# Patient Record
Sex: FEMALE | Race: WHITE | NOT HISPANIC OR LATINO | Employment: FULL TIME | URBAN - METROPOLITAN AREA
[De-identification: names, ages, dates, MRNs, and addresses within clinical notes are randomized per-mention and may not be internally consistent; named-entity substitution may affect disease eponyms.]

---

## 2017-10-17 ENCOUNTER — TRANSCRIBE ORDERS (OUTPATIENT)
Dept: ADMINISTRATIVE | Facility: HOSPITAL | Age: 52
End: 2017-10-17

## 2017-10-17 DIAGNOSIS — R10.13 DYSPEPSIA: Primary | ICD-10-CM

## 2017-10-19 ENCOUNTER — HOSPITAL ENCOUNTER (OUTPATIENT)
Dept: ULTRASOUND IMAGING | Facility: HOSPITAL | Age: 52
Discharge: HOME | End: 2017-10-19
Attending: INTERNAL MEDICINE | Admitting: INTERNAL MEDICINE

## 2017-10-19 DIAGNOSIS — R10.13 DYSPEPSIA: ICD-10-CM

## 2017-10-19 PROCEDURE — 76705 ECHO EXAM OF ABDOMEN: CPT

## 2021-01-14 ENCOUNTER — OFFICE VISIT (OUTPATIENT)
Dept: INTERNAL MEDICINE | Facility: CLINIC | Age: 56
End: 2021-01-14

## 2021-01-14 VITALS
HEART RATE: 62 BPM | DIASTOLIC BLOOD PRESSURE: 68 MMHG | BODY MASS INDEX: 21.34 KG/M2 | RESPIRATION RATE: 16 BRPM | HEIGHT: 64 IN | SYSTOLIC BLOOD PRESSURE: 110 MMHG | WEIGHT: 125 LBS | OXYGEN SATURATION: 98 % | TEMPERATURE: 96.9 F

## 2021-01-14 DIAGNOSIS — K13.29 WHITE PATCHES ON ORAL MUCOSA: Primary | ICD-10-CM

## 2021-01-14 DIAGNOSIS — K14.6 BURNING MOUTH SYNDROME: Chronic | ICD-10-CM

## 2021-01-14 PROCEDURE — 99213 OFFICE O/P EST LOW 20 MIN: CPT | Performed by: INTERNAL MEDICINE

## 2021-01-14 RX ORDER — AMITRIPTYLINE HYDROCHLORIDE 25 MG/1
50 TABLET, FILM COATED ORAL NIGHTLY
Qty: 180 TABLET | Refills: 1 | Status: SHIPPED | OUTPATIENT
Start: 2021-01-14 | End: 2023-01-30

## 2021-01-14 RX ORDER — AMITRIPTYLINE HYDROCHLORIDE 25 MG/1
TABLET, FILM COATED ORAL
COMMUNITY
Start: 2020-12-28 | End: 2021-01-14 | Stop reason: SDUPTHER

## 2021-01-14 NOTE — PROGRESS NOTES
"Chief Complaint  Thrush    Subjective          Essie Boss presents to [facility] for possible thrush. Has had similar infection in the past, occasionally will resolve with anti-fungals but not always. 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 No recent abx or ICS use. Has a very sour taste in her mouth associated. Dentist does not believe this is thrush. Has tried PPI medications in the past, seen ENT and laryngoscopy showed no reflux.     Objective   Vital Signs:     /68   Pulse 62   Temp 96.9 °F (36.1 °C)   Resp 16   Ht 162.6 cm (64\")   Wt 56.7 kg (125 lb)   SpO2 98%   BMI 21.46 kg/m²     Physical Exam  Vitals signs and nursing note reviewed.   Constitutional:       General: She is not in acute distress.     Appearance: Normal appearance.   HENT:      Nose: Nose normal.      Mouth/Throat:      Tongue: Lesions (white, discolored patches on tongue) present.      Pharynx: Oropharynx is clear. No oropharyngeal exudate.      Tonsils: No tonsillar exudate.   Cardiovascular:      Rate and Rhythm: Normal rate and regular rhythm.      Pulses: Normal pulses.      Heart sounds: No murmur.   Pulmonary:      Effort: Pulmonary effort is normal. No respiratory distress.      Breath sounds: Normal breath sounds.   Abdominal:      General: Bowel sounds are normal.      Palpations: Abdomen is soft.   Neurological:      Mental Status: She is alert.          Result Review : : None     Assessment and Plan      Diagnoses and all orders for this visit:    1. White patches on oral mucosa (Primary)   - feel strongly that this is not thrush, so pt advised to stop taking antifungal agent at this time, discussed reflux as potential cause but pt denies other symptoms other than sour taste in her mouth and is not interested in PPI at this time   - since exact etiology is unknown, and I am not sure if this may be part of the burning mouth syndrome, will refer " onto ENT for more thorough eval, biopsy if needed, and treatment as indicated    Orders:  -     Ambulatory Referral to ENT (Otolaryngology)    2. Burning mouth syndrome   - most symptoms well controlled currently unless current tongue concerns are related, will cont on same medication for now--> refills sent today   - ? If the tongue symptoms could be a dry mouth side effect from her amitriptyline, but she has had those issues predating when she started this medication    Orders:  -     amitriptyline (ELAVIL) 25 MG tablet; Take 2 tablets by mouth Every Night.  Dispense: 180 tablet; Refill: 1      Follow Up   Return if symptoms worsen or fail to improve.    Patient was given instructions and counseling regarding her condition or for health maintenance advice. Please see specific information pulled into the AVS if appropriate.     Anna Fraire MD  [facility] [place] [facility]  Phone: [phone]  Fax: [phone]

## 2021-01-26 ENCOUNTER — TELEPHONE (OUTPATIENT)
Dept: INTERNAL MEDICINE | Facility: CLINIC | Age: 56
End: 2021-01-26

## 2021-01-26 NOTE — TELEPHONE ENCOUNTER
Patient called and had covid. She still has a low grade fever and its been 12 days now. Would like advice on what she needs to do. Please call [phone].    [facility] #[id number] - [place] - [address] [address] [address] [address] - [phone] [facility] [phone]   [phone]

## 2021-01-28 ENCOUNTER — OFFICE VISIT (OUTPATIENT)
Dept: INTERNAL MEDICINE | Facility: CLINIC | Age: 56
End: 2021-01-28

## 2021-01-28 VITALS — TEMPERATURE: 98.1 F | HEIGHT: 61 IN | BODY MASS INDEX: 22.84 KG/M2 | WEIGHT: 121 LBS

## 2021-01-28 DIAGNOSIS — U07.1 COVID-19 VIRUS INFECTION: Primary | ICD-10-CM

## 2021-01-28 PROCEDURE — 99442 PR PHYS/QHP TELEPHONE EVALUATION 11-20 MIN: CPT | Performed by: INTERNAL MEDICINE

## 2021-01-28 NOTE — PROGRESS NOTES
"Chief Complaint  Fever and Covid-19 Home Monitoring Phone Call     You have chosen to receive care through a telephone visit. Do you consent to use a telephone visit for your medical care today? Yes      Subjective          Essie Boss presents to [facility] for follow up COVID. Symptoms started 1/16 with congestion and progressed to fever that persisted until yesterday 1/27. COVID testing was done and positive 1/18. Her  had COVID first but not sure where they got it from. No cough, SOB, chest pain. Drainage is excessive, has bloody rhinorrhea ongoing. Did treat herself for UTI at the start of her infection and has constipation which is ongoing.     Objective   Vital Signs:     Temp 98.1 °F (36.7 °C)   Ht 154.9 cm (61\")   Wt 54.9 kg (121 lb)   BMI 22.86 kg/m²         Result Review : : None     Assessment and Plan      Diagnoses and all orders for this visit:    1. COVID-19 virus infection (Primary)     - pt has been fever free now x 48 hours and has no other ongoing infectious symptoms--> Ok to be released from quarantine 1/30   - ok to cont use of prn OTC meds as needed   - will write letter clearing her from quarantine   - call back if any worsening of symptoms    I spent 11 minutes caring for Essie on this date of service. This time includes time spent by me in the following activities:teleconference    Follow Up   Return if symptoms worsen or fail to improve.    Patient was given instructions and counseling regarding her condition or for health maintenance advice. Please see specific information pulled into the AVS if appropriate.     Anna Fraire MD  [facility] [place] [facility]  Phone: [phone]  Fax: [phone]    "

## 2021-07-14 ENCOUNTER — TELEPHONE (OUTPATIENT)
Dept: INTERNAL MEDICINE | Facility: CLINIC | Age: 56
End: 2021-07-14

## 2021-07-14 DIAGNOSIS — Z12.31 ENCOUNTER FOR SCREENING MAMMOGRAM FOR MALIGNANT NEOPLASM OF BREAST: Primary | ICD-10-CM

## 2021-07-14 NOTE — TELEPHONE ENCOUNTER
EDIT: THE PATIENT GAVE A FAX NUMBER FOR ORDERS. THE NUMBER [phone] AND GOES TO DIAGNOSTIC RADIOLOGY.

## 2021-07-14 NOTE — TELEPHONE ENCOUNTER
Caller: Essie Boss    Relationship: Self    Best call back number: [phone]    What orders are you requesting (i.e. lab or imaging): MAMMOGRAM    In what timeframe would the patient need to come in: ASAP    Where will you receive your lab/imaging services: [facility]

## 2021-08-30 ENCOUNTER — TELEPHONE (OUTPATIENT)
Dept: OTHER | Facility: OTHER | Age: 56
End: 2021-08-30

## 2021-08-30 NOTE — TELEPHONE ENCOUNTER
Ok, then would recommend she cut the pill in half and take just a 1/2 tab nightly x 2-3 weeks and then can stop

## 2021-08-30 NOTE — TELEPHONE ENCOUNTER
If I am remembering correctly she was taking this for burning mouth syndrome?  Would recommend going down to just 1 tab nightly x 2-3 weeks, then ok to stop from there

## 2021-10-05 ENCOUNTER — TELEPHONE (OUTPATIENT)
Dept: INTERNAL MEDICINE | Facility: CLINIC | Age: 56
End: 2021-10-05

## 2021-10-05 NOTE — TELEPHONE ENCOUNTER
PATIENT CALLED WANTING TO KNOW ABOUT THE MAMMOGRAM THAT WAS TO BE SCHEDULED A COUPLE OF MONTHS AGO. SHE HAS NOT HEARD BACK AND WANTED TO KNOW ABOUT SCHEDULING THAT     Essie Boss (Self) [phone] (H)     PLEASE CALL AND ADVISE

## 2021-11-22 ENCOUNTER — TELEPHONE (OUTPATIENT)
Dept: INTERNAL MEDICINE | Facility: CLINIC | Age: 56
End: 2021-11-22

## 2021-11-24 DIAGNOSIS — Z00.00 ROUTINE GENERAL MEDICAL EXAMINATION AT A HEALTH CARE FACILITY: Primary | ICD-10-CM

## 2021-11-24 DIAGNOSIS — E55.9 VITAMIN D DEFICIENCY: ICD-10-CM

## 2021-11-25 LAB
25(OH)D3+25(OH)D2 SERPL-MCNC: 26.6 NG/ML (ref 30–100)
ALBUMIN SERPL-MCNC: 4.5 G/DL (ref 3.8–4.9)
ALBUMIN/GLOB SERPL: 2 {RATIO} (ref 1.2–2.2)
ALP SERPL-CCNC: 59 IU/L (ref 44–121)
ALT SERPL-CCNC: 17 IU/L (ref 0–32)
APPEARANCE UR: CLEAR
AST SERPL-CCNC: 17 IU/L (ref 0–40)
BACTERIA #/AREA URNS HPF: ABNORMAL /[HPF]
BASOPHILS # BLD AUTO: 0.1 X10E3/UL (ref 0–0.2)
BASOPHILS NFR BLD AUTO: 2 %
BILIRUB SERPL-MCNC: 0.4 MG/DL (ref 0–1.2)
BILIRUB UR QL STRIP: NEGATIVE
BUN SERPL-MCNC: 13 MG/DL (ref 6–24)
BUN/CREAT SERPL: 19 (ref 9–23)
CALCIUM SERPL-MCNC: 9.8 MG/DL (ref 8.7–10.2)
CASTS URNS QL MICRO: ABNORMAL /LPF
CHLORIDE SERPL-SCNC: 102 MMOL/L (ref 96–106)
CHOLEST SERPL-MCNC: 282 MG/DL (ref 100–199)
CHOLEST/HDLC SERPL: 3.2 RATIO (ref 0–4.4)
CO2 SERPL-SCNC: 24 MMOL/L (ref 20–29)
COLOR UR: YELLOW
CREAT SERPL-MCNC: 0.69 MG/DL (ref 0.57–1)
EOSINOPHIL # BLD AUTO: 0.1 X10E3/UL (ref 0–0.4)
EOSINOPHIL NFR BLD AUTO: 2 %
EPI CELLS #/AREA URNS HPF: ABNORMAL /HPF (ref 0–10)
ERYTHROCYTE [DISTWIDTH] IN BLOOD BY AUTOMATED COUNT: 14.2 % (ref 11.7–15.4)
GLOBULIN SER CALC-MCNC: 2.3 G/DL (ref 1.5–4.5)
GLUCOSE SERPL-MCNC: 92 MG/DL (ref 65–99)
GLUCOSE UR QL: NEGATIVE
HBA1C MFR BLD: 5.7 % (ref 4.8–5.6)
HCT VFR BLD AUTO: 35.5 % (ref 34–46.6)
HCV AB S/CO SERPL IA: <0.1 S/CO RATIO (ref 0–0.9)
HDLC SERPL-MCNC: 87 MG/DL
HGB BLD-MCNC: 11.7 G/DL (ref 11.1–15.9)
HGB UR QL STRIP: NEGATIVE
IMM GRANULOCYTES # BLD AUTO: 0 X10E3/UL (ref 0–0.1)
IMM GRANULOCYTES NFR BLD AUTO: 0 %
KETONES UR QL STRIP: NEGATIVE
LDLC SERPL CALC-MCNC: 187 MG/DL (ref 0–99)
LEUKOCYTE ESTERASE UR QL STRIP: ABNORMAL
LYMPHOCYTES # BLD AUTO: 1.3 X10E3/UL (ref 0.7–3.1)
LYMPHOCYTES NFR BLD AUTO: 32 %
MCH RBC QN AUTO: 26.7 PG (ref 26.6–33)
MCHC RBC AUTO-ENTMCNC: 33 G/DL (ref 31.5–35.7)
MCV RBC AUTO: 81 FL (ref 79–97)
MICRO URNS: ABNORMAL
MONOCYTES # BLD AUTO: 0.3 X10E3/UL (ref 0.1–0.9)
MONOCYTES NFR BLD AUTO: 9 %
NEUTROPHILS # BLD AUTO: 2.2 X10E3/UL (ref 1.4–7)
NEUTROPHILS NFR BLD AUTO: 55 %
NITRITE UR QL STRIP: POSITIVE
PH UR STRIP: 7 [PH] (ref 5–7.5)
PLATELET # BLD AUTO: 252 X10E3/UL (ref 150–450)
POTASSIUM SERPL-SCNC: 4.3 MMOL/L (ref 3.5–5.2)
PROT SERPL-MCNC: 6.8 G/DL (ref 6–8.5)
PROT UR QL STRIP: NEGATIVE
RBC # BLD AUTO: 4.39 X10E6/UL (ref 3.77–5.28)
RBC #/AREA URNS HPF: ABNORMAL /HPF (ref 0–2)
SODIUM SERPL-SCNC: 138 MMOL/L (ref 134–144)
SP GR UR: 1.01 (ref 1–1.03)
TRIGL SERPL-MCNC: 58 MG/DL (ref 0–149)
TSH SERPL DL<=0.005 MIU/L-ACNC: 2 UIU/ML (ref 0.45–4.5)
UROBILINOGEN UR STRIP-MCNC: 0.2 MG/DL (ref 0.2–1)
VLDLC SERPL CALC-MCNC: 8 MG/DL (ref 5–40)
WBC # BLD AUTO: 4 X10E3/UL (ref 3.4–10.8)
WBC #/AREA URNS HPF: ABNORMAL /HPF (ref 0–5)

## 2021-11-30 ENCOUNTER — OFFICE VISIT (OUTPATIENT)
Dept: INTERNAL MEDICINE | Facility: CLINIC | Age: 56
End: 2021-11-30

## 2021-11-30 VITALS
OXYGEN SATURATION: 99 % | HEIGHT: 61 IN | RESPIRATION RATE: 16 BRPM | BODY MASS INDEX: 24.35 KG/M2 | TEMPERATURE: 97.1 F | HEART RATE: 77 BPM | SYSTOLIC BLOOD PRESSURE: 114 MMHG | DIASTOLIC BLOOD PRESSURE: 76 MMHG | WEIGHT: 129 LBS

## 2021-11-30 DIAGNOSIS — Z00.00 ROUTINE GENERAL MEDICAL EXAMINATION AT A HEALTH CARE FACILITY: Primary | ICD-10-CM

## 2021-11-30 DIAGNOSIS — E78.00 PURE HYPERCHOLESTEROLEMIA: ICD-10-CM

## 2021-11-30 DIAGNOSIS — N30.90 CYSTITIS: ICD-10-CM

## 2021-11-30 PROCEDURE — 90472 IMMUNIZATION ADMIN EACH ADD: CPT | Performed by: INTERNAL MEDICINE

## 2021-11-30 PROCEDURE — 90715 TDAP VACCINE 7 YRS/> IM: CPT | Performed by: INTERNAL MEDICINE

## 2021-11-30 PROCEDURE — 90471 IMMUNIZATION ADMIN: CPT | Performed by: INTERNAL MEDICINE

## 2021-11-30 PROCEDURE — 99396 PREV VISIT EST AGE 40-64: CPT | Performed by: INTERNAL MEDICINE

## 2021-11-30 PROCEDURE — 90686 IIV4 VACC NO PRSV 0.5 ML IM: CPT | Performed by: INTERNAL MEDICINE

## 2021-11-30 RX ORDER — ATORVASTATIN CALCIUM 10 MG/1
10 TABLET, FILM COATED ORAL DAILY
Qty: 90 TABLET | Refills: 3 | Status: SHIPPED | OUTPATIENT
Start: 2021-11-30 | End: 2022-11-18

## 2021-11-30 RX ORDER — SULFAMETHOXAZOLE AND TRIMETHOPRIM 800; 160 MG/1; MG/1
1 TABLET ORAL 2 TIMES DAILY
Qty: 6 TABLET | Refills: 0 | Status: SHIPPED | OUTPATIENT
Start: 2021-11-30 | End: 2022-04-11 | Stop reason: SDUPTHER

## 2021-11-30 RX ORDER — CLONAZEPAM 0.5 MG/1
TABLET, ORALLY DISINTEGRATING ORAL
COMMUNITY
Start: 2021-10-29 | End: 2023-01-30

## 2022-04-11 ENCOUNTER — OFFICE VISIT (OUTPATIENT)
Dept: INTERNAL MEDICINE | Facility: CLINIC | Age: 57
End: 2022-04-11

## 2022-04-11 VITALS
WEIGHT: 132 LBS | RESPIRATION RATE: 16 BRPM | BODY MASS INDEX: 24.92 KG/M2 | HEART RATE: 79 BPM | OXYGEN SATURATION: 99 % | HEIGHT: 61 IN | TEMPERATURE: 95.9 F

## 2022-04-11 DIAGNOSIS — E78.00 PURE HYPERCHOLESTEROLEMIA: ICD-10-CM

## 2022-04-11 DIAGNOSIS — N39.0 RECURRENT UTI: Primary | ICD-10-CM

## 2022-04-11 LAB
BILIRUB BLD-MCNC: NEGATIVE MG/DL
CLARITY, POC: CLEAR
COLOR UR: YELLOW
EXPIRATION DATE: ABNORMAL
GLUCOSE UR STRIP-MCNC: NEGATIVE MG/DL
KETONES UR QL: NEGATIVE
LEUKOCYTE EST, POC: ABNORMAL
Lab: ABNORMAL
NITRITE UR-MCNC: NEGATIVE MG/ML
PH UR: 6 [PH] (ref 5–8)
PROT UR STRIP-MCNC: NEGATIVE MG/DL
RBC # UR STRIP: NEGATIVE /UL
SP GR UR: 1.01 (ref 1–1.03)
UROBILINOGEN UR QL: NORMAL

## 2022-04-11 PROCEDURE — 99213 OFFICE O/P EST LOW 20 MIN: CPT | Performed by: INTERNAL MEDICINE

## 2022-04-11 PROCEDURE — 81003 URINALYSIS AUTO W/O SCOPE: CPT | Performed by: INTERNAL MEDICINE

## 2022-04-11 RX ORDER — NITROFURANTOIN 25; 75 MG/1; MG/1
CAPSULE ORAL
Qty: 30 CAPSULE | Refills: 2 | Status: SHIPPED | OUTPATIENT
Start: 2022-04-11

## 2022-04-11 RX ORDER — PHENAZOPYRIDINE HYDROCHLORIDE 200 MG/1
200 TABLET, FILM COATED ORAL 3 TIMES DAILY PRN
Qty: 20 TABLET | Refills: 0 | Status: SHIPPED | OUTPATIENT
Start: 2022-04-11

## 2022-04-11 RX ORDER — SULFAMETHOXAZOLE AND TRIMETHOPRIM 800; 160 MG/1; MG/1
1 TABLET ORAL 2 TIMES DAILY
Qty: 6 TABLET | Refills: 0 | Status: SHIPPED | OUTPATIENT
Start: 2022-04-11 | End: 2022-04-15 | Stop reason: SDUPTHER

## 2022-04-11 NOTE — PROGRESS NOTES
"Chief Complaint  Urinary Tract Infection and Back Pain    Subjective          Essie Boss presents to [facility] for possible  UTI. Banks puny all weekend and very fatigued, then developed low back pain last night which is her typical. No fever or chills. Mild dysuria. No nausea or vomiting. No hematuria.   Has had issues with recurrent UTIs in the past but has now goen 6+ moths since her last infection. Is on a vaginal estrogen but has been more lax lately with this.     Objective   Vital Signs:     Pulse 79   Temp 95.9 °F (35.5 °C)   Resp 16   Ht 154.9 cm (61\")   Wt 59.9 kg (132 lb)   SpO2 99%   BMI 24.94 kg/m²     Physical Exam  Vitals and nursing note reviewed.   Constitutional:       General: She is not in acute distress.     Appearance: Normal appearance.   Cardiovascular:      Rate and Rhythm: Normal rate and regular rhythm.      Pulses: Normal pulses.      Heart sounds: Normal heart sounds. No murmur heard.  Pulmonary:      Effort: Pulmonary effort is normal. No respiratory distress.      Breath sounds: Normal breath sounds.   Abdominal:      General: Abdomen is flat. Bowel sounds are normal.      Palpations: Abdomen is soft.      Tenderness: There is abdominal tenderness (suprapubic tenderness). There is no right CVA tenderness or left CVA tenderness.   Musculoskeletal:      Right lower leg: No edema.      Left lower leg: No edema.   Neurological:      Mental Status: She is alert and oriented to person, place, and time. Mental status is at baseline.   Psychiatric:         Mood and Affect: Mood normal.         Behavior: Behavior normal.        Brief Urine Lab Results  (Last result in the past 365 days)      Color   Clarity   Blood   Leuk Est   Nitrite   Protein   CREAT   Urine HCG        04/11/22 1613 Yellow   Clear   Negative   Moderate (2+)   Negative   Negative                 Result Review :   The following data was reviewed by: Gris BRAY" MD Yee on 04/11/2022:  CMP    CMP 11/24/21   Glucose 92   BUN 13   Creatinine 0.69   eGFR Non  Am 97   eGFR African Am 112   Sodium 138   Potassium 4.3   Chloride 102   Calcium 9.8   Total Protein 6.8   Albumin 4.5   Globulin 2.3   Total Bilirubin 0.4   Alkaline Phosphatase 59   AST (SGOT) 17   ALT (SGPT) 17      Comments are available for some flowsheets but are not being displayed.           CBC w/diff    CBC w/Diff 11/24/21   WBC 4.0   RBC 4.39   Hemoglobin 11.7   Hematocrit 35.5   MCV 81   MCH 26.7   MCHC 33.0   RDW 14.2   Platelets 252   Neutrophil Rel % 55   Lymphocyte Rel % 32   Monocyte Rel % 9   Eosinophil Rel % 2   Basophil Rel % 2           Lipid Panel    Lipid Panel 11/24/21   Total Cholesterol 282 (A)   Triglycerides 58   HDL Cholesterol 87   VLDL Cholesterol 8   LDL Cholesterol  187 (A)   (A) Abnormal value            TSH    TSH 11/24/21   TSH 2.000           Most Recent A1C    HGBA1C Most Recent 11/24/21   Hemoglobin A1C 5.7 (A)   (A) Abnormal value       Comments are available for some flowsheets but are not being displayed.                Assessment and Plan      Diagnoses and all orders for this visit:    1. Recurrent UTI (Primary)   - bactrim for acute infection   - will send urine off for culture, change abx as indicated based on susceptibility testing   - will call in Rx for macrobid for ppx following sexual intercourse to help prevent future recurrences     Orders:  -     POCT urinalysis dipstick, automated  -     sulfamethoxazole-trimethoprim (BACTRIM DS,SEPTRA DS) 800-160 MG per tablet; Take 1 tablet by mouth 2 (Two) Times a Day.  Dispense: 6 tablet; Refill: 0  -     nitrofurantoin, macrocrystal-monohydrate, (Macrobid) 100 MG capsule; Take 1 capsule within 2 hours of sexual activity  Dispense: 30 capsule; Refill: 2  -     phenazopyridine (Pyridium) 200 MG tablet; Take 1 tablet by mouth 3 (Three) Times a Day As Needed for Bladder Spasms.  Dispense: 20 tablet; Refill: 0  -      Urine Culture - Urine, Urine, Clean Catch  -     Urinalysis With Microscopic - Urine, Clean Catch    2. Pure hypercholesterolemia  -     Comprehensive Metabolic Panel; Future  -     Lipid Panel; Future        Follow Up   Return for follow up after labs.    Patient was given instructions and counseling regarding her condition or for health maintenance advice. Please see specific information pulled into the AVS if appropriate.     Anna Fraire MD  [facility] [place] [facility]  Phone: [phone]  Fax: [phone]

## 2022-04-12 DIAGNOSIS — E78.00 PURE HYPERCHOLESTEROLEMIA: ICD-10-CM

## 2022-04-12 LAB
APPEARANCE UR: CLEAR
BACTERIA #/AREA URNS HPF: ABNORMAL /[HPF]
BILIRUB UR QL STRIP: NEGATIVE
CASTS URNS QL MICRO: ABNORMAL /LPF
COLOR UR: YELLOW
EPI CELLS #/AREA URNS HPF: ABNORMAL /HPF (ref 0–10)
GLUCOSE UR QL STRIP: NEGATIVE
HGB UR QL STRIP: NEGATIVE
KETONES UR QL STRIP: NEGATIVE
LEUKOCYTE ESTERASE UR QL STRIP: ABNORMAL
MICRO URNS: ABNORMAL
NITRITE UR QL STRIP: NEGATIVE
PH UR STRIP: 6 [PH] (ref 5–7.5)
PROT UR QL STRIP: NEGATIVE
RBC #/AREA URNS HPF: ABNORMAL /HPF (ref 0–2)
SP GR UR STRIP: 1.01 (ref 1–1.03)
UROBILINOGEN UR STRIP-MCNC: 0.2 MG/DL (ref 0.2–1)
WBC #/AREA URNS HPF: ABNORMAL /HPF (ref 0–5)

## 2022-04-13 LAB
ALBUMIN SERPL-MCNC: 4.4 G/DL (ref 3.8–4.9)
ALBUMIN/GLOB SERPL: 2 {RATIO} (ref 1.2–2.2)
ALP SERPL-CCNC: 53 IU/L (ref 44–121)
ALT SERPL-CCNC: 16 IU/L (ref 0–32)
AST SERPL-CCNC: 15 IU/L (ref 0–40)
BILIRUB SERPL-MCNC: 0.6 MG/DL (ref 0–1.2)
BUN SERPL-MCNC: 13 MG/DL (ref 6–24)
BUN/CREAT SERPL: 15 (ref 9–23)
CALCIUM SERPL-MCNC: 9.1 MG/DL (ref 8.7–10.2)
CHLORIDE SERPL-SCNC: 106 MMOL/L (ref 96–106)
CHOLEST SERPL-MCNC: 170 MG/DL (ref 100–199)
CO2 SERPL-SCNC: 20 MMOL/L (ref 20–29)
CREAT SERPL-MCNC: 0.85 MG/DL (ref 0.57–1)
EGFRCR SERPLBLD CKD-EPI 2021: 80 ML/MIN/1.73
GLOBULIN SER CALC-MCNC: 2.2 G/DL (ref 1.5–4.5)
GLUCOSE SERPL-MCNC: 88 MG/DL (ref 65–99)
HDLC SERPL-MCNC: 77 MG/DL
LDLC SERPL CALC-MCNC: 80 MG/DL (ref 0–99)
POTASSIUM SERPL-SCNC: 4.4 MMOL/L (ref 3.5–5.2)
PROT SERPL-MCNC: 6.6 G/DL (ref 6–8.5)
SODIUM SERPL-SCNC: 139 MMOL/L (ref 134–144)
TRIGL SERPL-MCNC: 69 MG/DL (ref 0–149)
VLDLC SERPL CALC-MCNC: 13 MG/DL (ref 5–40)

## 2022-04-15 ENCOUNTER — TELEPHONE (OUTPATIENT)
Dept: INTERNAL MEDICINE | Facility: CLINIC | Age: 57
End: 2022-04-15

## 2022-04-15 DIAGNOSIS — N39.0 RECURRENT UTI: ICD-10-CM

## 2022-04-15 LAB
BACTERIA UR CULT: ABNORMAL
BACTERIA UR CULT: ABNORMAL
OTHER ANTIBIOTIC SUSC ISLT: ABNORMAL

## 2022-04-15 RX ORDER — SULFAMETHOXAZOLE AND TRIMETHOPRIM 800; 160 MG/1; MG/1
1 TABLET ORAL 2 TIMES DAILY
Qty: 6 TABLET | Refills: 0 | Status: SHIPPED | OUTPATIENT
Start: 2022-04-15 | End: 2023-01-30

## 2022-04-15 NOTE — TELEPHONE ENCOUNTER
----- Message from Temi Pichardo MA sent at 4/15/2022  4:04 PM EDT -----  I S/W THE PATIENT AND RELAYED YOUR MESSAGE. SHE SAID THAT SHE FELT BETTER AFTER THE FIRST FEW DOSES, BUT TODAY SHE DOESN'T FEEL WELL.    THANK YOU

## 2022-10-21 ENCOUNTER — TRANSCRIBE ORDERS (OUTPATIENT)
Dept: ADMINISTRATIVE | Facility: HOSPITAL | Age: 57
End: 2022-10-21

## 2022-10-21 ENCOUNTER — LAB (OUTPATIENT)
Dept: LAB | Facility: HOSPITAL | Age: 57
End: 2022-10-21

## 2022-10-21 DIAGNOSIS — D16.32 BENIGN NEOPLASM OF SHORT BONE OF LEFT LOWER EXTREMITY: ICD-10-CM

## 2022-10-21 DIAGNOSIS — M20.22 HALLUX RIGIDUS OF LEFT FOOT: Primary | ICD-10-CM

## 2022-10-21 DIAGNOSIS — M20.22 HALLUX RIGIDUS OF LEFT FOOT: ICD-10-CM

## 2022-10-21 LAB
ALBUMIN SERPL-MCNC: 4.1 G/DL (ref 3.5–5.2)
ALBUMIN/GLOB SERPL: 1.6 G/DL
ALP SERPL-CCNC: 51 U/L (ref 39–117)
ALT SERPL W P-5'-P-CCNC: 17 U/L (ref 1–33)
ANION GAP SERPL CALCULATED.3IONS-SCNC: 12.1 MMOL/L (ref 5–15)
AST SERPL-CCNC: 15 U/L (ref 1–32)
BASOPHILS # BLD AUTO: 0.08 10*3/MM3 (ref 0–0.2)
BASOPHILS NFR BLD AUTO: 2.3 % (ref 0–1.5)
BILIRUB SERPL-MCNC: 0.8 MG/DL (ref 0–1.2)
BUN SERPL-MCNC: 11 MG/DL (ref 6–20)
BUN/CREAT SERPL: 15.1 (ref 7–25)
CALCIUM SPEC-SCNC: 10 MG/DL (ref 8.6–10.5)
CHLORIDE SERPL-SCNC: 103 MMOL/L (ref 98–107)
CO2 SERPL-SCNC: 24.9 MMOL/L (ref 22–29)
CREAT SERPL-MCNC: 0.73 MG/DL (ref 0.57–1)
DEPRECATED RDW RBC AUTO: 46 FL (ref 37–54)
EGFRCR SERPLBLD CKD-EPI 2021: 95.5 ML/MIN/1.73
EOSINOPHIL # BLD AUTO: 0.06 10*3/MM3 (ref 0–0.4)
EOSINOPHIL NFR BLD AUTO: 1.7 % (ref 0.3–6.2)
ERYTHROCYTE [DISTWIDTH] IN BLOOD BY AUTOMATED COUNT: 16.4 % (ref 12.3–15.4)
GLOBULIN UR ELPH-MCNC: 2.5 GM/DL
GLUCOSE SERPL-MCNC: 88 MG/DL (ref 65–99)
HCT VFR BLD AUTO: 35.9 % (ref 34–46.6)
HGB BLD-MCNC: 11.4 G/DL (ref 12–15.9)
LYMPHOCYTES # BLD AUTO: 1.19 10*3/MM3 (ref 0.7–3.1)
LYMPHOCYTES NFR BLD AUTO: 34 % (ref 19.6–45.3)
MCH RBC QN AUTO: 24.6 PG (ref 26.6–33)
MCHC RBC AUTO-ENTMCNC: 31.8 G/DL (ref 31.5–35.7)
MCV RBC AUTO: 77.4 FL (ref 79–97)
MONOCYTES # BLD AUTO: 0.35 10*3/MM3 (ref 0.1–0.9)
MONOCYTES NFR BLD AUTO: 10 % (ref 5–12)
NEUTROPHILS NFR BLD AUTO: 1.82 10*3/MM3 (ref 1.7–7)
NEUTROPHILS NFR BLD AUTO: 52 % (ref 42.7–76)
PLATELET # BLD AUTO: 216 10*3/MM3 (ref 140–450)
PMV BLD AUTO: 12.2 FL (ref 6–12)
POTASSIUM SERPL-SCNC: 4.5 MMOL/L (ref 3.5–5.2)
PROT SERPL-MCNC: 6.6 G/DL (ref 6–8.5)
RBC # BLD AUTO: 4.64 10*6/MM3 (ref 3.77–5.28)
SODIUM SERPL-SCNC: 140 MMOL/L (ref 136–145)
WBC NRBC COR # BLD: 3.5 10*3/MM3 (ref 3.4–10.8)

## 2022-10-21 PROCEDURE — 80053 COMPREHEN METABOLIC PANEL: CPT

## 2022-10-21 PROCEDURE — 85025 COMPLETE CBC W/AUTO DIFF WBC: CPT

## 2022-10-21 PROCEDURE — 36415 COLL VENOUS BLD VENIPUNCTURE: CPT

## 2022-11-18 DIAGNOSIS — E78.00 PURE HYPERCHOLESTEROLEMIA: ICD-10-CM

## 2022-11-18 RX ORDER — ATORVASTATIN CALCIUM 10 MG/1
TABLET, FILM COATED ORAL
Qty: 90 TABLET | Refills: 1 | Status: SHIPPED | OUTPATIENT
Start: 2022-11-18

## 2022-11-18 NOTE — TELEPHONE ENCOUNTER
Rx Refill Note  Requested Prescriptions     Pending Prescriptions Disp Refills   • atorvastatin (LIPITOR) 10 MG tablet [Pharmacy Med Name: Atorvastatin Calcium 10 MG Oral Tablet] 30 tablet 0     Sig: Take 1 tablet by mouth once daily      Last office visit with prescribing clinician: 4/11/2022      Next office visit with prescribing clinician: Visit date not found            Ariela Quintanilla MA  11/18/22, 08:40 EST

## 2023-01-23 ENCOUNTER — TELEPHONE (OUTPATIENT)
Dept: INTERNAL MEDICINE | Facility: CLINIC | Age: 58
End: 2023-01-23

## 2023-01-23 NOTE — TELEPHONE ENCOUNTER
Caller: Essie Boss    Relationship: Self    Best call back number: [phone]    What orders are you requesting (i.e. lab or imaging): LABS    In what timeframe would the patient need to come in: PRIOR TO 1/30/23    Where will you receive your lab/imaging services: IN OFFICE    Additional notes: PATIENT REQUESTS A CALL BACK TO SCHEDULE LABS ONCE ORDERS ARE IN

## 2023-01-25 ENCOUNTER — LAB (OUTPATIENT)
Dept: INTERNAL MEDICINE | Facility: CLINIC | Age: 58
End: 2023-01-25
Payer: COMMERCIAL

## 2023-01-25 DIAGNOSIS — E55.9 VITAMIN D DEFICIENCY: ICD-10-CM

## 2023-01-25 DIAGNOSIS — N39.0 RECURRENT UTI: ICD-10-CM

## 2023-01-25 DIAGNOSIS — Z00.00 ROUTINE GENERAL MEDICAL EXAMINATION AT A HEALTH CARE FACILITY: Primary | ICD-10-CM

## 2023-01-25 DIAGNOSIS — E78.00 PURE HYPERCHOLESTEROLEMIA: ICD-10-CM

## 2023-01-26 LAB
25(OH)D3+25(OH)D2 SERPL-MCNC: 31.4 NG/ML (ref 30–100)
ALBUMIN SERPL-MCNC: 4.5 G/DL (ref 3.8–4.9)
ALBUMIN/GLOB SERPL: 2 {RATIO} (ref 1.2–2.2)
ALP SERPL-CCNC: 56 IU/L (ref 44–121)
ALT SERPL-CCNC: 17 IU/L (ref 0–32)
APPEARANCE UR: CLEAR
AST SERPL-CCNC: 18 IU/L (ref 0–40)
BACTERIA #/AREA URNS HPF: NORMAL /HPF
BASOPHILS # BLD AUTO: 0.1 X10E3/UL (ref 0–0.2)
BASOPHILS NFR BLD AUTO: 2 %
BILIRUB SERPL-MCNC: 0.6 MG/DL (ref 0–1.2)
BILIRUB UR QL STRIP: NEGATIVE
BUN SERPL-MCNC: 14 MG/DL (ref 6–24)
BUN/CREAT SERPL: 17 (ref 9–23)
CALCIUM SERPL-MCNC: 9.8 MG/DL (ref 8.7–10.2)
CASTS URNS MICRO: NORMAL
CHLORIDE SERPL-SCNC: 103 MMOL/L (ref 96–106)
CHOLEST SERPL-MCNC: 182 MG/DL (ref 100–199)
CHOLEST/HDLC SERPL: 2.3 RATIO (ref 0–4.4)
CO2 SERPL-SCNC: 24 MMOL/L (ref 20–29)
COLOR UR: YELLOW
CREAT SERPL-MCNC: 0.81 MG/DL (ref 0.57–1)
EGFRCR SERPLBLD CKD-EPI 2021: 84 ML/MIN/1.73
EOSINOPHIL # BLD AUTO: 0.1 X10E3/UL (ref 0–0.4)
EOSINOPHIL NFR BLD AUTO: 2 %
EPI CELLS #/AREA URNS HPF: NORMAL /HPF
ERYTHROCYTE [DISTWIDTH] IN BLOOD BY AUTOMATED COUNT: 19.1 % (ref 11.7–15.4)
GLOBULIN SER CALC-MCNC: 2.2 G/DL (ref 1.5–4.5)
GLUCOSE SERPL-MCNC: 94 MG/DL (ref 70–99)
GLUCOSE UR QL STRIP: NEGATIVE
GLUCOSE UR QL STRIP: NORMAL
HBA1C MFR BLD: 5.7 % (ref 4.8–5.6)
HCT VFR BLD AUTO: 40.4 % (ref 34–46.6)
HDLC SERPL-MCNC: 78 MG/DL
HGB BLD-MCNC: 12.8 G/DL (ref 11.1–15.9)
HGB UR QL STRIP: NEGATIVE
IMM GRANULOCYTES # BLD AUTO: 0 X10E3/UL (ref 0–0.1)
IMM GRANULOCYTES NFR BLD AUTO: 0 %
KETONES UR QL STRIP: NEGATIVE
KETONES UR QL STRIP: NORMAL
LDLC SERPL CALC-MCNC: 91 MG/DL (ref 0–99)
LEUKOCYTE ESTERASE UR QL STRIP: NEGATIVE
LYMPHOCYTES # BLD AUTO: 1.5 X10E3/UL (ref 0.7–3.1)
LYMPHOCYTES NFR BLD AUTO: 39 %
MCH RBC QN AUTO: 26.1 PG (ref 26.6–33)
MCHC RBC AUTO-ENTMCNC: 31.7 G/DL (ref 31.5–35.7)
MCV RBC AUTO: 82 FL (ref 79–97)
MONOCYTES # BLD AUTO: 0.3 X10E3/UL (ref 0.1–0.9)
MONOCYTES NFR BLD AUTO: 7 %
NEUTROPHILS # BLD AUTO: 1.9 X10E3/UL (ref 1.4–7)
NEUTROPHILS NFR BLD AUTO: 50 %
NITRITE UR QL STRIP: NEGATIVE
PH UR STRIP: 6 [PH] (ref 5–8)
PH UR STRIP: NORMAL [PH]
PLATELET # BLD AUTO: 221 X10E3/UL (ref 150–450)
POTASSIUM SERPL-SCNC: 4.9 MMOL/L (ref 3.5–5.2)
PROT SERPL-MCNC: 6.7 G/DL (ref 6–8.5)
PROT UR QL STRIP: NEGATIVE
PROT UR QL STRIP: NORMAL
RBC # BLD AUTO: 4.91 X10E6/UL (ref 3.77–5.28)
RBC #/AREA URNS HPF: NORMAL /HPF
SODIUM SERPL-SCNC: 139 MMOL/L (ref 134–144)
SP GR UR STRIP: 1.01 (ref 1–1.03)
SP GR UR STRIP: NORMAL
SPECIMEN STATUS: NORMAL
TRIGL SERPL-MCNC: 68 MG/DL (ref 0–149)
TSH SERPL DL<=0.005 MIU/L-ACNC: 1.83 UIU/ML (ref 0.45–4.5)
UROBILINOGEN UR STRIP-MCNC: NORMAL MG/DL
VLDLC SERPL CALC-MCNC: 13 MG/DL (ref 5–40)
WBC # BLD AUTO: 3.8 X10E3/UL (ref 3.4–10.8)
WBC #/AREA URNS HPF: NORMAL /HPF

## 2023-01-30 ENCOUNTER — OFFICE VISIT (OUTPATIENT)
Dept: INTERNAL MEDICINE | Facility: CLINIC | Age: 58
End: 2023-01-30
Payer: COMMERCIAL

## 2023-01-30 VITALS
BODY MASS INDEX: 25.3 KG/M2 | WEIGHT: 134 LBS | TEMPERATURE: 97.2 F | SYSTOLIC BLOOD PRESSURE: 102 MMHG | HEART RATE: 75 BPM | HEIGHT: 61 IN | DIASTOLIC BLOOD PRESSURE: 60 MMHG | RESPIRATION RATE: 16 BRPM | OXYGEN SATURATION: 96 %

## 2023-01-30 DIAGNOSIS — Z00.00 ROUTINE GENERAL MEDICAL EXAMINATION AT A HEALTH CARE FACILITY: Primary | ICD-10-CM

## 2023-01-30 DIAGNOSIS — Z12.31 BREAST CANCER SCREENING BY MAMMOGRAM: ICD-10-CM

## 2023-01-30 DIAGNOSIS — R41.840 ATTENTION DEFICIT: ICD-10-CM

## 2023-01-30 PROCEDURE — 99396 PREV VISIT EST AGE 40-64: CPT | Performed by: INTERNAL MEDICINE

## 2023-01-30 RX ORDER — FLUCONAZOLE 150 MG/1
150 TABLET ORAL ONCE
Qty: 1 TABLET | Refills: 0 | Status: SHIPPED | OUTPATIENT
Start: 2023-01-30 | End: 2023-01-30

## 2023-01-30 RX ORDER — ESTRADIOL 0.1 MG/G
1 CREAM VAGINAL 2 TIMES WEEKLY
COMMUNITY
Start: 2022-05-10 | End: 2023-05-10

## 2023-01-30 NOTE — PROGRESS NOTES
Chief Complaint   Patient presents with   • Annual Exam       Subjective   History of Present Illness    Essie Boss is a 57 y.o. female here for annual wellness. Pt does have acute issues to discuss today. States overall things are going well. Taking all meds as prescribed without any issues.   Has noticed more issues losing weight lately, gained weight with vacation, holidays, and had surgery on her foot which decreased physical exercise, having more trouble getting this back off now.     The following portions of the patient's history were reviewed and updated as appropriate: allergies, current medications, past family history, past medical history, past social history, past surgical history, and problem list.    Patient Care Team:  Gris Fraire MD as PCP - General (Internal Medicine & Pediatrics)  Leighann Hawk (Obstetrics and Gynecology)  Caio Wagoner DDS (Dental General Practice)  Donna Gonzalez MD (Obstetrics and Gynecology)    Review of Systems   Constitutional: Negative for activity change, chills and fever.   HENT: Negative for congestion, ear pain, rhinorrhea and sore throat.    Eyes: Negative for double vision, pain and visual disturbance.   Respiratory: Negative for cough, shortness of breath and wheezing.    Cardiovascular: Negative for chest pain, palpitations and leg swelling.   Gastrointestinal: Negative for abdominal pain, blood in stool, constipation, diarrhea, nausea and vomiting.   Endocrine: Negative for cold intolerance and heat intolerance.   Genitourinary: Negative for difficulty urinating, dysuria and frequency.   Musculoskeletal: Negative for arthralgias and myalgias.   Skin: Negative for rash and skin lesions.   Neurological: Negative for dizziness, tremors and headache.   Hematological: Negative for adenopathy. Does not bruise/bleed easily.   Psychiatric/Behavioral: Negative for behavioral problems and suicidal ideas.       Body mass index is 25.32 kg/m².   Vitals:  "   01/30/23 1035   BP: 102/60   Pulse: 75   Resp: 16   Temp: 97.2 °F (36.2 °C)   SpO2: 96%   Weight: 60.8 kg (134 lb)   Height: 154.9 cm (61\")        Objective   Physical Exam  Vitals and nursing note reviewed.   Constitutional:       General: She is not in acute distress.     Appearance: Normal appearance.   HENT:      Head: Normocephalic and atraumatic.      Right Ear: Tympanic membrane and ear canal normal.      Left Ear: Tympanic membrane and ear canal normal.      Nose: Nose normal.      Mouth/Throat:      Mouth: Mucous membranes are moist.      Pharynx: Oropharynx is clear.   Eyes:      Extraocular Movements: Extraocular movements intact.      Conjunctiva/sclera: Conjunctivae normal.      Pupils: Pupils are equal, round, and reactive to light.   Cardiovascular:      Rate and Rhythm: Normal rate and regular rhythm.      Heart sounds: Normal heart sounds. No murmur heard.  Pulmonary:      Effort: Pulmonary effort is normal. No respiratory distress.      Breath sounds: Normal breath sounds. No wheezing or rhonchi.   Abdominal:      General: Bowel sounds are normal. There is no distension.      Palpations: Abdomen is soft. There is no mass.      Tenderness: There is no abdominal tenderness.      Comments: no hepatosplenomegaly   Musculoskeletal:         General: No deformity. Normal range of motion.      Cervical back: Normal range of motion and neck supple.   Skin:     General: Skin is warm and dry.      Capillary Refill: Capillary refill takes less than 2 seconds.      Findings: No lesion or rash.   Neurological:      General: No focal deficit present.      Mental Status: She is alert and oriented to person, place, and time.      Cranial Nerves: No cranial nerve deficit.   Psychiatric:         Mood and Affect: Mood normal.         Behavior: Behavior normal.         Judgment: Judgment normal.         Brief Urine Lab Results  (Last result in the past 365 days)      Color   Clarity   Blood   Leuk Est   Nitrite   " Protein   CREAT   Urine HCG        01/25/23 1310 Yellow  Comment: REFERENCE RANGE: Yellow, Straw   Clear   Negative   Negative   Negative   Negative                   Current Outpatient Medications:   •  atorvastatin (LIPITOR) 10 MG tablet, Take 1 tablet by mouth once daily, Disp: 90 tablet, Rfl: 1  •  Cetirizine HCl 10 MG capsule, Take  by mouth., Disp: , Rfl:   •  estradiol (ESTRACE) 0.1 MG/GM vaginal cream, Insert 1 g into the vagina 2 (Two) Times a Week., Disp: , Rfl:   •  nitrofurantoin, macrocrystal-monohydrate, (Macrobid) 100 MG capsule, Take 1 capsule within 2 hours of sexual activity, Disp: 30 capsule, Rfl: 2  •  phenazopyridine (Pyridium) 200 MG tablet, Take 1 tablet by mouth 3 (Three) Times a Day As Needed for Bladder Spasms., Disp: 20 tablet, Rfl: 0  •  fluconazole (DIFLUCAN) 150 MG tablet, Take 1 tablet by mouth 1 (One) Time for 1 dose., Disp: 1 tablet, Rfl: 0     Lab Results   Component Value Date    HGBA1C 5.7 (H) 01/25/2023    TSH 1.830 01/25/2023    YPBS26MJ 31.4 01/25/2023        Health Maintenance   Topic Date Due   • ZOSTER VACCINE (1 of 2) Never done   • INFLUENZA VACCINE  03/31/2023 (Originally 8/1/2022)   • MAMMOGRAM  10/14/2023   • LIPID PANEL  01/25/2024   • PAP SMEAR  11/22/2024   • TDAP/TD VACCINES (3 - Td or Tdap) 11/30/2031        Immunization History   Administered Date(s) Administered   • COVID-19 (PFIZER) PURPLE CAP 05/03/2021, 05/24/2021   • FluLaval/Fluzone >6mos 11/30/2021   • Flublock Quad =>18yrs 08/28/2020   • Flublok 18+yrs 01/25/2022   • Hepatitis A 11/28/2018, 08/28/2020   • Influenza, Unspecified 08/01/2020   • Tdap 01/01/2011, 11/30/2021       Assessment & Plan     Annual Wellness  - Labs reviewed today including CBC, CMP, Fasting lipid panel, HgbA1C, TSH and Vit D.   - Cont current medications for chronic medical issues, refills sent as needed today.   - EKG not indicated  - PAP smear up to date and managed by gynecology. Last 11/21 and neg HPV and cytology, repeat every  3-5 years  - Mammo due, ordered today  - Cscope Last done 09/2020 and abnormal with polyps, repeat 5 years, due 2025  - DEXA not yet indicated, due at 65 yo  - Lung CA screening not indicated  - Immunizations: Flu shot declined. COVID series completed, booster eligible. TDAP done 2021, UTD. Hep A series completed. Discussed shingrix, pt has opted to hold off on this for now.    - Depression screen reviewed with patient and negative. Has struggled with focus for many years and becoming more and more functional impairing, difficult to get through [employer] playing [employer] and impacting her work--> will refer for neuropsych testing  - Advised behavioral modifications including dietary changes and increased exercise with goal of healthy weight and lifestyle.  - Fluconazole x 1 sent for yeast infection       Return in about 1 year (around 1/30/2024) for Annual physical.     Anna Fraire MD  [facility] [place] [facility] [place] [facility]  Phone: [phone]  Fax: [phone]

## 2023-06-17 DIAGNOSIS — E78.00 PURE HYPERCHOLESTEROLEMIA: ICD-10-CM

## 2023-06-19 RX ORDER — ATORVASTATIN CALCIUM 10 MG/1
TABLET, FILM COATED ORAL
Qty: 90 TABLET | Refills: 0 | Status: SHIPPED | OUTPATIENT
Start: 2023-06-19

## 2023-09-25 DIAGNOSIS — E78.00 PURE HYPERCHOLESTEROLEMIA: ICD-10-CM

## 2023-09-25 RX ORDER — ATORVASTATIN CALCIUM 10 MG/1
TABLET, FILM COATED ORAL
Qty: 90 TABLET | Refills: 1 | Status: SHIPPED | OUTPATIENT
Start: 2023-09-25

## 2023-09-25 NOTE — TELEPHONE ENCOUNTER
Rx Refill Note  Requested Prescriptions     Pending Prescriptions Disp Refills    atorvastatin (LIPITOR) 10 MG tablet [Pharmacy Med Name: Atorvastatin Calcium 10 MG Oral Tablet] 90 tablet 0     Sig: Take 1 tablet by mouth once daily      Last office visit with prescribing clinician: Visit date not found   Last telemedicine visit with prescribing clinician: Visit date not found   Next office visit with prescribing clinician: Visit date not found                         Would you like a call back once the refill request has been completed: [] Yes [] No    If the office needs to give you a call back, can they leave a voicemail: [] Yes [] No    Temi Pichardo MA  09/25/23, 08:46 EDT

## 2024-03-27 DIAGNOSIS — E78.00 PURE HYPERCHOLESTEROLEMIA: ICD-10-CM

## 2024-03-27 RX ORDER — ATORVASTATIN CALCIUM 10 MG/1
10 TABLET, FILM COATED ORAL DAILY
Qty: 30 TABLET | Refills: 0 | Status: SHIPPED | OUTPATIENT
Start: 2024-03-27

## 2024-04-03 ENCOUNTER — TELEPHONE (OUTPATIENT)
Dept: INTERNAL MEDICINE | Facility: CLINIC | Age: 59
End: 2024-04-03
Payer: COMMERCIAL

## 2024-04-03 NOTE — TELEPHONE ENCOUNTER
Please check with patient to see if her pharmacy has 20 mg tablets that she could cut in half or if she has tried to see if other pharmacies have the medication.  Perhaps there is a mail-order option that she has too? Thank you.

## 2024-04-03 NOTE — TELEPHONE ENCOUNTER
[facility] pharmacy sent a fax stating pts Atorvastatin that you prescribed on 3/27/24 is on backorder.  Pt has appt to see you on 4/25/24  Please advise

## 2024-04-23 DIAGNOSIS — E78.00 PURE HYPERCHOLESTEROLEMIA: ICD-10-CM

## 2024-04-23 NOTE — TELEPHONE ENCOUNTER
Rx Refill Note  Requested Prescriptions     Pending Prescriptions Disp Refills    atorvastatin (LIPITOR) 10 MG tablet [Pharmacy Med Name: Atorvastatin Calcium 10 MG Oral Tablet] 30 tablet 0     Sig: Take 1 tablet by mouth once daily      Last office visit with prescribing clinician: Visit date not found   Last telemedicine visit with prescribing clinician: Visit date not found   Next office visit with prescribing clinician: 4/25/2024                         Would you like a call back once the refill request has been completed: [] Yes [] No    If the office needs to give you a call back, can they leave a voicemail: [] Yes [] No    Temi Pichardo MA  04/23/24, 08:03 EDT

## 2024-04-25 ENCOUNTER — OFFICE VISIT (OUTPATIENT)
Dept: INTERNAL MEDICINE | Facility: CLINIC | Age: 59
End: 2024-04-25
Payer: COMMERCIAL

## 2024-04-25 VITALS
SYSTOLIC BLOOD PRESSURE: 114 MMHG | DIASTOLIC BLOOD PRESSURE: 78 MMHG | HEART RATE: 76 BPM | WEIGHT: 134.8 LBS | TEMPERATURE: 98 F | HEIGHT: 61 IN | BODY MASS INDEX: 25.45 KG/M2 | OXYGEN SATURATION: 96 %

## 2024-04-25 DIAGNOSIS — E78.00 PURE HYPERCHOLESTEROLEMIA: ICD-10-CM

## 2024-04-25 DIAGNOSIS — K14.6 BURNING MOUTH SYNDROME: Chronic | ICD-10-CM

## 2024-04-25 DIAGNOSIS — K59.09 CHRONIC CONSTIPATION: ICD-10-CM

## 2024-04-25 DIAGNOSIS — E55.9 VITAMIN D DEFICIENCY: ICD-10-CM

## 2024-04-25 DIAGNOSIS — D64.9 ANEMIA, UNSPECIFIED TYPE: ICD-10-CM

## 2024-04-25 DIAGNOSIS — R53.83 FATIGUE, UNSPECIFIED TYPE: Primary | ICD-10-CM

## 2024-04-25 DIAGNOSIS — Z76.89 ENCOUNTER TO ESTABLISH CARE: ICD-10-CM

## 2024-04-25 DIAGNOSIS — R73.03 PREDIABETES: ICD-10-CM

## 2024-04-25 PROCEDURE — 99214 OFFICE O/P EST MOD 30 MIN: CPT | Performed by: STUDENT IN AN ORGANIZED HEALTH CARE EDUCATION/TRAINING PROGRAM

## 2024-04-25 NOTE — PROGRESS NOTES
Chief Complaint  Establish Care, Hyperlipidemia, and Fatigue    Subjective        Essie Boss presents to [facility]  History of Present Illness  Essie is an established patient of Gris Fraire MD presenting to establish care with a new provider and for management of fatigue, constipation.  She has hypercholesterolemia, prediabetes, olfactory allergies, burning mouth syndrome, chronic constipation, dermatographism, s/p L foot surgery for bone spur/bunion.  Also follows with Leighann Hawk MD, ObGyn, HEMANTH Stephenson, at [facility], . Solitario Hicks, DPSIMI.  Last colonoscopy with  John Campbell MD, gastroenterology.  4/25/2024 labs showing CBC normal, normal, lipids normal, TSH normal, vitamin D normal, A1c normal, ferritin low, iron saturation low.    Fatigue/anemia: Has felt fatigued 1-2 days/month for many months, but in the last few weeks she has been feeling fatigued several times a week.  She has had recent foot surgery and review of records show she has been anemic since 2/2024.  Denies blood in urine or stool.    Chronic constipation: Has had symptoms for years.  Has tried stool softeners, fiber supplements, squatty potty.  Typically bowel movements only twice a week at most, some weeks only once she can be miserable.  Review of 9/2/2020 colonoscopy report identifying tortuous colon, but nothing else that would be related to her constipation.  Records show barium swallow 10/14/2019 which was essentially normal.    Burning mouth syndrome: For for 6 years.  Manages with Zyrtec.  Has seen allergist and dentist.  Dentist has helped the most.    Hypercholesterolemia: Managed with Lipitor.    ROS: Denies headaches, changes in vision, changes in hearing, sore throat, shortness of breath, palpitations, nausea/vomiting/constipation/diarrhea, abdominal pain, blood in urine or stool, leg swelling.     Patient has been erroneously marked as  "diabetic. Based on the available clinical information, she does not have diabetes and should therefore be excluded from diabetic health maintenance and quality measures for the remainder of the reporting period.     FH:     Medical: Mother with breast cancer.  Father with hyperlipidemia, leukemia, EtOH abuse.    Siblings: Brother-EtOH abuse.  Other siblings?    Children: Daughter-Lupus.  Son.  Other children?    Reproductive: .  Pap/HPV , no history of abnormal Pap.  S/p oophorectomy.    Objective   Vital Signs:  /78   Pulse 76   Temp 98 °F (36.7 °C)   Ht 154.9 cm (61\")   Wt 61.1 kg (134 lb 12.8 oz)   SpO2 96%   BMI 25.47 kg/m²   Estimated body mass index is 25.47 kg/m² as calculated from the following:    Height as of this encounter: 154.9 cm (61\").    Weight as of this encounter: 61.1 kg (134 lb 12.8 oz).             Physical Exam  Vitals and nursing note reviewed.   Constitutional:       General: She is not in acute distress.     Appearance: Normal appearance.   HENT:      Head: Normocephalic and atraumatic.   Eyes:      Extraocular Movements: Extraocular movements intact.      Conjunctiva/sclera: Conjunctivae normal.   Cardiovascular:      Rate and Rhythm: Normal rate and regular rhythm.      Pulses: Normal pulses.      Heart sounds: Normal heart sounds. No murmur heard.     No friction rub. No gallop.   Pulmonary:      Effort: Pulmonary effort is normal.      Breath sounds: Normal breath sounds. No wheezing, rhonchi or rales.   Abdominal:      General: Bowel sounds are normal. There is no distension.      Palpations: Abdomen is soft.      Tenderness: There is abdominal tenderness (mild) in the left lower quadrant. There is no right CVA tenderness, left CVA tenderness or guarding.   Musculoskeletal:      Right lower leg: No edema.      Left lower leg: No edema.   Skin:     General: Skin is warm.      Capillary Refill: Capillary refill takes less than 2 seconds.   Neurological:      General: " No focal deficit present.      Mental Status: She is alert. Mental status is at baseline.   Psychiatric:         Mood and Affect: Mood normal.         Behavior: Behavior normal.        Result Review :                   Assessment and Plan   Diagnoses and all orders for this visit:    1. Fatigue, unspecified type (Primary)  -     TSH Rfx On Abnormal To Free T4    2. Anemia, unspecified type  -     CBC & Differential  -     Iron Profile  -     Ferritin  -     Urinalysis With Microscopic If Indicated (No Culture) - Urine, Clean Catch  -     Microscopic Examination -  -     ferrous gluconate (FERGON) 324 MG tablet; Take 1 tablet by mouth Every Other Day. Best absorbed on an empty stomach with something acidic like orange or apple juice.  Do not take with calcium products.  May be taken with food if needed.  Dispense: 45 tablet; Refill: 1    3. Chronic constipation  -     Celiac Disease Panel  -     Psyllium Husk powder; Use 3 g 3 times a day.    4. Burning mouth syndrome    5. Pure hypercholesterolemia  -     Comprehensive Metabolic Panel  -     Lipid Panel  -     atorvastatin (LIPITOR) 10 MG tablet; Take 1 tablet by mouth Daily.  Dispense: 90 tablet; Refill: 3    6. Prediabetes  -     Hemoglobin A1c    7. Vitamin D deficiency  -     Vitamin D,25-Hydroxy    8. Encounter to establish care    Fatigue/anemia: Anemia likely secondary to blood loss from recent surgery since hemoglobin improved to normal range on 4/25/2024 labs, although iron stores are still low.  Fatigue likely secondary to low iron stores.  Adding iron supplement.  Patient denied blood in urine or stool, and UA ordered in clinic today does not identify any blood.    Chronic constipation: Recommended trial of increasing psyllium or adding daily MiraLAX.  If no improvement, will consider management for IBS-C.    Burning mouth syndrome: Continue current Zyrtec.  Discuss trial of Mirapex, gabapentin, Lyrica, amitriptyline, capsaicin, ALA at future visit.  Iron  supplement may help as well.    Hypercholesterolemia: Good control based on 4/25/2024 labs which resulted after clinic visit.  Continue current Lipitor.     Social/Preventative:    Risks: Mother with breast cancer.  Father with hyperlipidemia, leukemia, EtOH abuse.  Brother with EtOH abuse.  Daughter with lupus.    PAP/HPV: 2021, normal, repeat in 5 years    Mammogram: 11/21/2023, normal    Dexa: At 64    Colonoscopy: 9/2/2020, polyps, repeat in 5 years.    Vaccinations: Influenza and COVID in the past.  Tdap 11/2021.  No Shingrix, pneumococcal.    Eye: Deferred    Dental: Deferred    Foot exams: N/A    Nicotine: Never    Illicit drugs: None, no marijuana    EtOH: Deferred    Home: Lives at home with ???, feels safe    Work:  Working  at [employer] along with  and son.    Social: Deferred    Exercise: Deferred    Diet: Deferred      Current Outpatient Medications:     atorvastatin (LIPITOR) 10 MG tablet, Take 1 tablet by mouth Daily., Disp: 90 tablet, Rfl: 3    B Complex Vitamins (vitamin b complex) capsule capsule, Take  by mouth Daily., Disp: , Rfl:     Cetirizine HCl 10 MG capsule, Take  by mouth., Disp: , Rfl:     Cholecalciferol 25 MCG (1000 UT) tablet, Take 1 tablet by mouth Daily., Disp: , Rfl:     Magnesium Citrate 100 MG tablet, Take  by mouth., Disp: , Rfl:     Zinc Sulfate (ZINC 15 PO), Take  by mouth., Disp: , Rfl:     ferrous gluconate (FERGON) 324 MG tablet, Take 1 tablet by mouth Every Other Day. Best absorbed on an empty stomach with something acidic like orange or apple juice.  Do not take with calcium products.  May be taken with food if needed., Disp: 45 tablet, Rfl: 1    Psyllium Husk powder, Use 3 g 3 times a day., Disp: , Rfl:        Follow Up   Return in about 3 months (around 7/25/2024) for Annual physical, iron deficiency, constipation, burning mouth.  Patient was given instructions and counseling regarding her condition or for health maintenance advice.  Please see specific information pulled into the AVS if appropriate.

## 2024-04-25 NOTE — PATIENT INSTRUCTIONS
-Psyllium: Start with 1/2 tsp daily and increase to 2-3 tbsp 2-3 times a day.  -Miralax: Titrate for daily soft stools.

## 2024-04-26 PROBLEM — E55.9 VITAMIN D DEFICIENCY: Status: ACTIVE | Noted: 2024-04-26

## 2024-04-26 PROBLEM — R73.03 PREDIABETES: Status: ACTIVE | Noted: 2024-04-26

## 2024-04-26 LAB
25(OH)D3+25(OH)D2 SERPL-MCNC: 34.6 NG/ML (ref 30–100)
ALBUMIN SERPL-MCNC: 4.6 G/DL (ref 3.8–4.9)
ALBUMIN/GLOB SERPL: 2.1 {RATIO} (ref 1.2–2.2)
ALP SERPL-CCNC: 66 IU/L (ref 44–121)
ALT SERPL-CCNC: 22 IU/L (ref 0–32)
APPEARANCE UR: CLEAR
AST SERPL-CCNC: 20 IU/L (ref 0–40)
BACTERIA #/AREA URNS HPF: NORMAL /HPF
BASOPHILS # BLD AUTO: 0.1 X10E3/UL (ref 0–0.2)
BASOPHILS NFR BLD AUTO: 2 %
BILIRUB SERPL-MCNC: 1.1 MG/DL (ref 0–1.2)
BILIRUB UR QL STRIP: NEGATIVE
BUN SERPL-MCNC: 14 MG/DL (ref 6–24)
BUN/CREAT SERPL: 18 (ref 9–23)
CALCIUM SERPL-MCNC: 9.9 MG/DL (ref 8.7–10.2)
CASTS URNS MICRO: NORMAL
CHLORIDE SERPL-SCNC: 104 MMOL/L (ref 96–106)
CHOLEST SERPL-MCNC: 170 MG/DL (ref 100–199)
CO2 SERPL-SCNC: 22 MMOL/L (ref 20–29)
COLOR UR: ABNORMAL
CREAT SERPL-MCNC: 0.79 MG/DL (ref 0.57–1)
EGFRCR SERPLBLD CKD-EPI 2021: 86 ML/MIN/1.73
ENDOMYSIUM IGA SER QL: NEGATIVE
EOSINOPHIL # BLD AUTO: 0.1 X10E3/UL (ref 0–0.4)
EOSINOPHIL NFR BLD AUTO: 2 %
EPI CELLS #/AREA URNS HPF: NORMAL /HPF
ERYTHROCYTE [DISTWIDTH] IN BLOOD BY AUTOMATED COUNT: 16.9 % (ref 11.7–15.4)
FERRITIN SERPL-MCNC: 8 NG/ML (ref 15–150)
GLOBULIN SER CALC-MCNC: 2.2 G/DL (ref 1.5–4.5)
GLUCOSE SERPL-MCNC: 93 MG/DL (ref 70–99)
GLUCOSE UR QL STRIP: NEGATIVE
HBA1C MFR BLD: 5.6 % (ref 4.8–5.6)
HCT VFR BLD AUTO: 36.8 % (ref 34–46.6)
HDLC SERPL-MCNC: 80 MG/DL
HGB BLD-MCNC: 11.6 G/DL (ref 11.1–15.9)
HGB UR QL STRIP: NEGATIVE
IGA SERPL-MCNC: 210 MG/DL (ref 87–352)
IMM GRANULOCYTES # BLD AUTO: 0 X10E3/UL (ref 0–0.1)
IMM GRANULOCYTES NFR BLD AUTO: 0 %
IRON SATN MFR SERPL: 11 % (ref 15–55)
IRON SERPL-MCNC: 46 UG/DL (ref 27–159)
KETONES UR QL STRIP: NEGATIVE
LDLC SERPL CALC-MCNC: 77 MG/DL (ref 0–99)
LEUKOCYTE ESTERASE UR QL STRIP: ABNORMAL
LYMPHOCYTES # BLD AUTO: 1.3 X10E3/UL (ref 0.7–3.1)
LYMPHOCYTES NFR BLD AUTO: 36 %
MCH RBC QN AUTO: 26 PG (ref 26.6–33)
MCHC RBC AUTO-ENTMCNC: 31.5 G/DL (ref 31.5–35.7)
MCV RBC AUTO: 82 FL (ref 79–97)
MONOCYTES # BLD AUTO: 0.3 X10E3/UL (ref 0.1–0.9)
MONOCYTES NFR BLD AUTO: 9 %
NEUTROPHILS # BLD AUTO: 1.8 X10E3/UL (ref 1.4–7)
NEUTROPHILS NFR BLD AUTO: 51 %
NITRITE UR QL STRIP: NEGATIVE
PH UR STRIP: 6.5 [PH] (ref 5–8)
PLATELET # BLD AUTO: 248 X10E3/UL (ref 150–450)
POTASSIUM SERPL-SCNC: 4.4 MMOL/L (ref 3.5–5.2)
PROT SERPL-MCNC: 6.8 G/DL (ref 6–8.5)
PROT UR QL STRIP: NEGATIVE
RBC # BLD AUTO: 4.47 X10E6/UL (ref 3.77–5.28)
RBC #/AREA URNS HPF: NORMAL /HPF
SODIUM SERPL-SCNC: 139 MMOL/L (ref 134–144)
SP GR UR STRIP: 1.02 (ref 1–1.03)
TIBC SERPL-MCNC: 402 UG/DL (ref 250–450)
TRIGL SERPL-MCNC: 71 MG/DL (ref 0–149)
TSH SERPL DL<=0.005 MIU/L-ACNC: 1.9 UIU/ML (ref 0.45–4.5)
TTG IGA SER-ACNC: <2 U/ML (ref 0–3)
UIBC SERPL-MCNC: 356 UG/DL (ref 131–425)
UROBILINOGEN UR STRIP-MCNC: ABNORMAL MG/DL
VLDLC SERPL CALC-MCNC: 13 MG/DL (ref 5–40)
WBC # BLD AUTO: 3.6 X10E3/UL (ref 3.4–10.8)
WBC #/AREA URNS HPF: NORMAL /HPF

## 2024-04-26 RX ORDER — MELATONIN
1000 DAILY
COMMUNITY

## 2024-04-26 RX ORDER — ATORVASTATIN CALCIUM 10 MG/1
10 TABLET, FILM COATED ORAL DAILY
Qty: 30 TABLET | Refills: 0 | OUTPATIENT
Start: 2024-04-26

## 2024-04-26 RX ORDER — ATORVASTATIN CALCIUM 10 MG/1
10 TABLET, FILM COATED ORAL DAILY
Qty: 90 TABLET | Refills: 3 | Status: SHIPPED | OUTPATIENT
Start: 2024-04-26

## 2024-04-26 RX ORDER — VITAMIN B COMPLEX
CAPSULE ORAL DAILY
COMMUNITY

## 2024-04-26 RX ORDER — PSYLLIUM HUSK 100 %
3 POWDER (GRAM) MISCELLANEOUS 3 TIMES DAILY
Start: 2024-04-26

## 2024-04-26 RX ORDER — CALCIUM CARBONATE 260MG(650)
TABLET,CHEWABLE ORAL
COMMUNITY

## 2024-04-26 RX ORDER — FERROUS GLUCONATE 324(38)MG
324 TABLET ORAL EVERY OTHER DAY
Qty: 45 TABLET | Refills: 1 | Status: SHIPPED | OUTPATIENT
Start: 2024-04-26